# Patient Record
Sex: MALE | URBAN - METROPOLITAN AREA
[De-identification: names, ages, dates, MRNs, and addresses within clinical notes are randomized per-mention and may not be internally consistent; named-entity substitution may affect disease eponyms.]

---

## 2021-10-28 ENCOUNTER — APPOINTMENT (RX ONLY)
Dept: URBAN - METROPOLITAN AREA OTHER 11 | Facility: OTHER | Age: 56
Setting detail: DERMATOLOGY
End: 2021-10-28

## 2021-10-28 DIAGNOSIS — L73.8 OTHER SPECIFIED FOLLICULAR DISORDERS: ICD-10-CM

## 2021-10-28 DIAGNOSIS — D18.0 HEMANGIOMA: ICD-10-CM

## 2021-10-28 DIAGNOSIS — D485 NEOPLASM OF UNCERTAIN BEHAVIOR OF SKIN: ICD-10-CM

## 2021-10-28 DIAGNOSIS — D22 MELANOCYTIC NEVI: ICD-10-CM

## 2021-10-28 PROBLEM — D48.5 NEOPLASM OF UNCERTAIN BEHAVIOR OF SKIN: Status: ACTIVE | Noted: 2021-10-28

## 2021-10-28 PROBLEM — D22.62 MELANOCYTIC NEVI OF LEFT UPPER LIMB, INCLUDING SHOULDER: Status: ACTIVE | Noted: 2021-10-28

## 2021-10-28 PROBLEM — D23.62 OTHER BENIGN NEOPLASM OF SKIN OF LEFT UPPER LIMB, INCLUDING SHOULDER: Status: ACTIVE | Noted: 2021-10-28

## 2021-10-28 PROBLEM — D18.01 HEMANGIOMA OF SKIN AND SUBCUTANEOUS TISSUE: Status: ACTIVE | Noted: 2021-10-28

## 2021-10-28 PROBLEM — D22.5 MELANOCYTIC NEVI OF TRUNK: Status: ACTIVE | Noted: 2021-10-28

## 2021-10-28 PROBLEM — D22.72 MELANOCYTIC NEVI OF LEFT LOWER LIMB, INCLUDING HIP: Status: ACTIVE | Noted: 2021-10-28

## 2021-10-28 PROCEDURE — 99202 OFFICE O/P NEW SF 15 MIN: CPT | Mod: 25

## 2021-10-28 PROCEDURE — 11102 TANGNTL BX SKIN SINGLE LES: CPT

## 2021-10-28 PROCEDURE — ? COUNSELING

## 2021-10-28 PROCEDURE — ? BIOPSY BY SHAVE METHOD

## 2021-10-28 ASSESSMENT — LOCATION DETAILED DESCRIPTION DERM
LOCATION DETAILED: LEFT MID-UPPER BACK
LOCATION DETAILED: LEFT CLAVICULAR SKIN
LOCATION DETAILED: LEFT FOREHEAD
LOCATION DETAILED: LEFT INFERIOR LATERAL LOWER BACK
LOCATION DETAILED: RIGHT FOREHEAD
LOCATION DETAILED: LEFT PROXIMAL PRETIBIAL REGION
LOCATION DETAILED: LEFT PROXIMAL DORSAL FOREARM

## 2021-10-28 ASSESSMENT — LOCATION SIMPLE DESCRIPTION DERM
LOCATION SIMPLE: LEFT LOWER BACK
LOCATION SIMPLE: LEFT UPPER BACK
LOCATION SIMPLE: LEFT FOREARM
LOCATION SIMPLE: LEFT PRETIBIAL REGION
LOCATION SIMPLE: LEFT FOREHEAD
LOCATION SIMPLE: LEFT CLAVICULAR SKIN
LOCATION SIMPLE: RIGHT FOREHEAD

## 2021-10-28 ASSESSMENT — LOCATION ZONE DERM
LOCATION ZONE: LEG
LOCATION ZONE: ARM
LOCATION ZONE: TRUNK
LOCATION ZONE: FACE

## 2021-10-28 NOTE — PROCEDURE: BIOPSY BY SHAVE METHOD
Detail Level: Detailed
Depth Of Biopsy: dermis
Was A Bandage Applied: Yes
Size Of Lesion In Cm: 0.3
X Size Of Lesion In Cm: 0
Biopsy Type: H and E
Biopsy Method: Dermablade
Anesthesia Type: 1% lidocaine with 1:100,000 epinephrine and a 1:10 solution of 8.4% sodium bicarbonate
Anesthesia Volume In Cc: 0.5
Hemostasis: Drysol
Wound Care: Vaseline
Dressing: bandage
Destruction After The Procedure: No
Type Of Destruction Used: Curettage
Curettage Text: The wound bed was treated with curettage after the biopsy was performed.
Cryotherapy Text: The wound bed was treated with cryotherapy after the biopsy was performed.
Electrodesiccation Text: The wound bed was treated with electrodesiccation after the biopsy was performed.
Electrodesiccation And Curettage Text: The wound bed was treated with electrodesiccation and curettage after the biopsy was performed.
Silver Nitrate Text: The wound bed was treated with silver nitrate after the biopsy was performed.
Lab: [id number]
Lab Facility: [id number]
Consent: Written consent was obtained and risks were reviewed including but not limited to scarring, infection, bleeding, scabbing, incomplete removal, nerve damage and allergy to anesthesia.
Post-Care Instructions: I reviewed with the patient in detail post-care instructions. Patient is to keep the biopsy site dry overnight, and then apply vaseline twice daily until healed.
Notification Instructions: Patient will be notified of biopsy results. However, patient instructed to call the office if not contacted within 2 weeks.
Billing Type: Third-Party Bill
Information: Selecting Yes will display possible errors in your note based on the variables you have selected. This validation is only offered as a suggestion for you. PLEASE NOTE THAT THE VALIDATION TEXT WILL BE REMOVED WHEN YOU FINALIZE YOUR NOTE. IF YOU WANT TO FAX A PRELIMINARY NOTE YOU WILL NEED TO TOGGLE THIS TO 'NO' IF YOU DO NOT WANT IT IN YOUR FAXED NOTE.

## 2021-10-28 NOTE — HPI: FULL BODY SKIN EXAMINATION
What Type Of Note Output Would You Prefer (Optional)?: Standard Output
What Is The Reason For Today's Visit?: Annual Full Body Skin Examination with No Concerns
What Is The Reason For Today's Visit? (Being Monitored For X): concerning skin lesions on an annual basis
Additional History: Patient denies any concerning lesions today

## 2022-12-01 ENCOUNTER — APPOINTMENT (RX ONLY)
Dept: URBAN - METROPOLITAN AREA OTHER 11 | Facility: OTHER | Age: 57
Setting detail: DERMATOLOGY
End: 2022-12-01

## 2022-12-01 DIAGNOSIS — L71.8 OTHER ROSACEA: ICD-10-CM

## 2022-12-01 DIAGNOSIS — L57.0 ACTINIC KERATOSIS: ICD-10-CM

## 2022-12-01 DIAGNOSIS — D18.0 HEMANGIOMA: ICD-10-CM

## 2022-12-01 PROBLEM — D18.01 HEMANGIOMA OF SKIN AND SUBCUTANEOUS TISSUE: Status: ACTIVE | Noted: 2022-12-01

## 2022-12-01 PROBLEM — D23.5 OTHER BENIGN NEOPLASM OF SKIN OF TRUNK: Status: ACTIVE | Noted: 2022-12-01

## 2022-12-01 PROCEDURE — ? PRESCRIPTION MEDICATION MANAGEMENT

## 2022-12-01 PROCEDURE — ? PRESCRIPTION

## 2022-12-01 PROCEDURE — ? LIQUID NITROGEN

## 2022-12-01 PROCEDURE — ? IN-HOUSE DISPENSING PHARMACY

## 2022-12-01 PROCEDURE — ? COUNSELING

## 2022-12-01 PROCEDURE — 17003 DESTRUCT PREMALG LES 2-14: CPT

## 2022-12-01 PROCEDURE — 17000 DESTRUCT PREMALG LESION: CPT

## 2022-12-01 PROCEDURE — 99213 OFFICE O/P EST LOW 20 MIN: CPT | Mod: 25

## 2022-12-01 ASSESSMENT — LOCATION DETAILED DESCRIPTION DERM
LOCATION DETAILED: LEFT MEDIAL SUPERIOR CHEST
LOCATION DETAILED: LEFT SUPERIOR CENTRAL MALAR CHEEK
LOCATION DETAILED: NASAL DORSUM
LOCATION DETAILED: LEFT CENTRAL MALAR CHEEK

## 2022-12-01 ASSESSMENT — LOCATION SIMPLE DESCRIPTION DERM
LOCATION SIMPLE: NOSE
LOCATION SIMPLE: CHEST
LOCATION SIMPLE: LEFT CHEEK

## 2022-12-01 ASSESSMENT — LOCATION ZONE DERM
LOCATION ZONE: FACE
LOCATION ZONE: TRUNK
LOCATION ZONE: NOSE

## 2022-12-01 NOTE — PROCEDURE: PRESCRIPTION MEDICATION MANAGEMENT
Detail Level: Zone
Render In Strict Bullet Format?: No
Initiate Treatment: Apply pea sized amount of rosacea triple gel to affected area QD

## 2022-12-01 NOTE — PROCEDURE: LIQUID NITROGEN
Render Note In Bullet Format When Appropriate: No
Show Aperture Variable?: Yes
Consent: The patient's consent was obtained including but not limited to risks of crusting, scabbing, blistering, scarring, darker or lighter pigmentary change, recurrence, incomplete removal and infection.
Number Of Freeze-Thaw Cycles: 2 freeze-thaw cycles
Detail Level: Detailed
Duration Of Freeze Thaw-Cycle (Seconds): 3
Post-Care Instructions: I reviewed with the patient in detail post-care instructions. Patient is to wear sunprotection, and avoid picking at any of the treated lesions. Pt may apply Vaseline to crusted or scabbing areas.

## 2022-12-01 NOTE — PROCEDURE: IN-HOUSE DISPENSING PHARMACY
Product 17 Unit Type: mg
Product 72 Refills: 0
Product 1 Price/Unit (In Dollars): 57.75
Product 1 Refills: 11
Product 1 Amount/Unit (Numbers Only): 30
Product 1 Unit Type: ml
Render Refills If Set To 0: Yes
Name Of Product 1: Rosacea Triple Gel
Product 1 Units Dispensed: 1
Product 1 Application Directions: Apply to face QD as tolerated
Detail Level: Zone
Send Charges To Patient Encounter: No

## 2024-04-02 ENCOUNTER — APPOINTMENT (RX ONLY)
Dept: URBAN - METROPOLITAN AREA OTHER 11 | Facility: OTHER | Age: 59
Setting detail: DERMATOLOGY
End: 2024-04-02

## 2024-04-02 DIAGNOSIS — D22 MELANOCYTIC NEVI: ICD-10-CM

## 2024-04-02 DIAGNOSIS — L72.0 EPIDERMAL CYST: ICD-10-CM

## 2024-04-02 DIAGNOSIS — D18.0 HEMANGIOMA: ICD-10-CM

## 2024-04-02 DIAGNOSIS — D485 NEOPLASM OF UNCERTAIN BEHAVIOR OF SKIN: ICD-10-CM

## 2024-04-02 PROBLEM — D18.01 HEMANGIOMA OF SKIN AND SUBCUTANEOUS TISSUE: Status: ACTIVE | Noted: 2024-04-02

## 2024-04-02 PROBLEM — D22.5 MELANOCYTIC NEVI OF TRUNK: Status: ACTIVE | Noted: 2024-04-02

## 2024-04-02 PROBLEM — D48.5 NEOPLASM OF UNCERTAIN BEHAVIOR OF SKIN: Status: ACTIVE | Noted: 2024-04-02

## 2024-04-02 PROCEDURE — 11102 TANGNTL BX SKIN SINGLE LES: CPT

## 2024-04-02 PROCEDURE — ? BIOPSY BY SHAVE METHOD

## 2024-04-02 PROCEDURE — ? COUNSELING

## 2024-04-02 PROCEDURE — ? TREATMENT REGIMEN

## 2024-04-02 PROCEDURE — 99213 OFFICE O/P EST LOW 20 MIN: CPT | Mod: 25

## 2024-04-02 ASSESSMENT — LOCATION SIMPLE DESCRIPTION DERM
LOCATION SIMPLE: ABDOMEN
LOCATION SIMPLE: RIGHT UPPER BACK
LOCATION SIMPLE: UPPER BACK
LOCATION SIMPLE: LEFT UPPER BACK

## 2024-04-02 ASSESSMENT — LOCATION DETAILED DESCRIPTION DERM
LOCATION DETAILED: LEFT INFERIOR UPPER BACK
LOCATION DETAILED: EPIGASTRIC SKIN
LOCATION DETAILED: SUPERIOR THORACIC SPINE
LOCATION DETAILED: RIGHT MID-UPPER BACK

## 2024-04-02 ASSESSMENT — LOCATION ZONE DERM: LOCATION ZONE: TRUNK

## 2024-09-13 ENCOUNTER — APPOINTMENT (RX ONLY)
Dept: URBAN - METROPOLITAN AREA OTHER 11 | Facility: OTHER | Age: 59
Setting detail: DERMATOLOGY
End: 2024-09-13

## 2024-09-13 DIAGNOSIS — L24 IRRITANT CONTACT DERMATITIS: ICD-10-CM

## 2024-09-13 PROBLEM — L24.9 IRRITANT CONTACT DERMATITIS, UNSPECIFIED CAUSE: Status: ACTIVE | Noted: 2024-09-13

## 2024-09-13 PROCEDURE — 99213 OFFICE O/P EST LOW 20 MIN: CPT

## 2024-09-13 PROCEDURE — ? PRESCRIPTION

## 2024-09-13 PROCEDURE — ? TREATMENT REGIMEN

## 2024-09-13 PROCEDURE — ? COUNSELING

## 2024-09-13 RX ORDER — DESONIDE OINTMENT, 0.05% 0.5 MG/G
OINTMENT TOPICAL QAM
Qty: 15 | Refills: 0 | Status: ERX | COMMUNITY
Start: 2024-09-13

## 2024-09-13 RX ADMIN — DESONIDE OINTMENT, 0.05%: 0.5 OINTMENT TOPICAL at 00:00

## 2024-09-13 ASSESSMENT — LOCATION ZONE DERM: LOCATION ZONE: PENIS

## 2024-09-13 ASSESSMENT — LOCATION DETAILED DESCRIPTION DERM: LOCATION DETAILED: DISTAL DORSAL GLANS PENIS

## 2024-09-13 ASSESSMENT — LOCATION SIMPLE DESCRIPTION DERM: LOCATION SIMPLE: DORSAL PENIS

## 2024-09-13 NOTE — HPI: SKIN LESION
Is This A New Presentation, Or A Follow-Up?: Skin Lesion
Additional History: He reports he has been diagnosed with HSV in the past and he thought it might have been a break out, he was put on oral medication and has also tried treating it with antifungal creams but regimen failed to resolve as lesion remains present.

## 2024-09-13 NOTE — PROCEDURE: TREATMENT REGIMEN
Plan: Advised patient to apply Vaseline to the area treated to prevent from drying out
Detail Level: Simple
Initiate Treatment: Desonide 0.05% topical ointment QHS x1-2 weeks

## 2024-10-03 ENCOUNTER — APPOINTMENT (RX ONLY)
Dept: URBAN - METROPOLITAN AREA OTHER 11 | Facility: OTHER | Age: 59
Setting detail: DERMATOLOGY
End: 2024-10-03

## 2024-10-03 DIAGNOSIS — L24 IRRITANT CONTACT DERMATITIS: ICD-10-CM

## 2024-10-03 PROBLEM — L24.9 IRRITANT CONTACT DERMATITIS, UNSPECIFIED CAUSE: Status: ACTIVE | Noted: 2024-10-03

## 2024-10-03 PROCEDURE — ? PRESCRIPTION

## 2024-10-03 PROCEDURE — ? TREATMENT REGIMEN

## 2024-10-03 PROCEDURE — 99213 OFFICE O/P EST LOW 20 MIN: CPT

## 2024-10-03 PROCEDURE — ? COUNSELING

## 2024-10-03 RX ORDER — DESONIDE OINTMENT, 0.05% 0.5 MG/G
OINTMENT TOPICAL QAM
Qty: 15 | Refills: 0 | Status: ERX | COMMUNITY
Start: 2024-10-03

## 2024-10-03 RX ADMIN — DESONIDE OINTMENT, 0.05%: 0.5 OINTMENT TOPICAL at 00:00

## 2024-10-03 ASSESSMENT — LOCATION DETAILED DESCRIPTION DERM: LOCATION DETAILED: DISTAL DORSAL GLANS PENIS

## 2024-10-03 ASSESSMENT — LOCATION ZONE DERM: LOCATION ZONE: PENIS

## 2024-10-03 ASSESSMENT — LOCATION SIMPLE DESCRIPTION DERM: LOCATION SIMPLE: DORSAL PENIS

## 2024-10-03 NOTE — PROCEDURE: TREATMENT REGIMEN
Plan: Advised patient to apply Vaseline or desitin to the area QHS
Detail Level: Simple
Initiate Treatment: Desonide 0.05% topical ointment QHS PRN

## 2025-04-02 ENCOUNTER — APPOINTMENT (OUTPATIENT)
Dept: URBAN - METROPOLITAN AREA OTHER 11 | Facility: OTHER | Age: 60
Setting detail: DERMATOLOGY
End: 2025-04-02

## 2025-04-02 DIAGNOSIS — L82.1 OTHER SEBORRHEIC KERATOSIS: ICD-10-CM

## 2025-04-02 DIAGNOSIS — L57.0 ACTINIC KERATOSIS: ICD-10-CM

## 2025-04-02 DIAGNOSIS — D22 MELANOCYTIC NEVI: ICD-10-CM

## 2025-04-02 DIAGNOSIS — D18.0 HEMANGIOMA: ICD-10-CM

## 2025-04-02 DIAGNOSIS — D485 NEOPLASM OF UNCERTAIN BEHAVIOR OF SKIN: ICD-10-CM

## 2025-04-02 PROBLEM — D48.5 NEOPLASM OF UNCERTAIN BEHAVIOR OF SKIN: Status: ACTIVE | Noted: 2025-04-02

## 2025-04-02 PROBLEM — D18.01 HEMANGIOMA OF SKIN AND SUBCUTANEOUS TISSUE: Status: ACTIVE | Noted: 2025-04-02

## 2025-04-02 PROBLEM — D22.5 MELANOCYTIC NEVI OF TRUNK: Status: ACTIVE | Noted: 2025-04-02

## 2025-04-02 PROCEDURE — ? LIQUID NITROGEN

## 2025-04-02 PROCEDURE — ? COUNSELING

## 2025-04-02 PROCEDURE — ? TREATMENT REGIMEN

## 2025-04-02 PROCEDURE — 99213 OFFICE O/P EST LOW 20 MIN: CPT | Mod: 25

## 2025-04-02 PROCEDURE — 17000 DESTRUCT PREMALG LESION: CPT | Mod: 59

## 2025-04-02 PROCEDURE — ? BIOPSY BY SHAVE METHOD

## 2025-04-02 PROCEDURE — 11102 TANGNTL BX SKIN SINGLE LES: CPT

## 2025-04-02 PROCEDURE — 17003 DESTRUCT PREMALG LES 2-14: CPT

## 2025-04-02 ASSESSMENT — LOCATION DETAILED DESCRIPTION DERM
LOCATION DETAILED: RIGHT CLAVICULAR NECK
LOCATION DETAILED: NASAL INFRATIP
LOCATION DETAILED: RIGHT NASAL DORSUM
LOCATION DETAILED: RIGHT INFERIOR MEDIAL UPPER BACK
LOCATION DETAILED: SUPERIOR THORACIC SPINE
LOCATION DETAILED: EPIGASTRIC SKIN

## 2025-04-02 ASSESSMENT — LOCATION SIMPLE DESCRIPTION DERM
LOCATION SIMPLE: ABDOMEN
LOCATION SIMPLE: UPPER BACK
LOCATION SIMPLE: RIGHT ANTERIOR NECK
LOCATION SIMPLE: RIGHT UPPER BACK
LOCATION SIMPLE: NOSE

## 2025-04-02 ASSESSMENT — LOCATION ZONE DERM
LOCATION ZONE: TRUNK
LOCATION ZONE: NOSE
LOCATION ZONE: NECK